# Patient Record
Sex: FEMALE
[De-identification: names, ages, dates, MRNs, and addresses within clinical notes are randomized per-mention and may not be internally consistent; named-entity substitution may affect disease eponyms.]

---

## 2018-11-05 ENCOUNTER — HOSPITAL ENCOUNTER (EMERGENCY)
Dept: HOSPITAL 31 - C.ER | Age: 53
Discharge: HOME | End: 2018-11-05
Payer: MEDICAID

## 2018-11-05 VITALS
RESPIRATION RATE: 15 BRPM | TEMPERATURE: 97.8 F | HEART RATE: 64 BPM | SYSTOLIC BLOOD PRESSURE: 126 MMHG | DIASTOLIC BLOOD PRESSURE: 78 MMHG

## 2018-11-05 VITALS — OXYGEN SATURATION: 100 %

## 2018-11-05 DIAGNOSIS — R42: Primary | ICD-10-CM

## 2018-11-05 DIAGNOSIS — I10: ICD-10-CM

## 2018-11-05 LAB
ALBUMIN SERPL-MCNC: 4.3 G/DL (ref 3.5–5)
ALBUMIN/GLOB SERPL: 1.1 {RATIO} (ref 1–2.1)
ALT SERPL-CCNC: 18 U/L (ref 9–52)
APTT BLD: 33 SECONDS (ref 21–34)
AST SERPL-CCNC: 36 U/L (ref 14–36)
BASOPHILS # BLD AUTO: 0.1 K/UL (ref 0–0.2)
BASOPHILS NFR BLD: 1 % (ref 0–2)
BILIRUB UR-MCNC: NEGATIVE MG/DL
BUN SERPL-MCNC: 11 MG/DL (ref 7–17)
CALCIUM SERPL-MCNC: 9.3 MG/DL (ref 8.6–10.4)
CK MB SERPL-MCNC: 0.92 NG/ML (ref 0–3.38)
EOSINOPHIL # BLD AUTO: 0.2 K/UL (ref 0–0.7)
EOSINOPHIL NFR BLD: 1.7 % (ref 0–4)
ERYTHROCYTE [DISTWIDTH] IN BLOOD BY AUTOMATED COUNT: 15.8 % (ref 11.5–14.5)
GFR NON-AFRICAN AMERICAN: > 60
GLUCOSE UR STRIP-MCNC: NORMAL MG/DL
HGB BLD-MCNC: 12.9 G/DL (ref 11–16)
INR PPP: 1.2
LEUKOCYTE ESTERASE UR-ACNC: (no result) LEU/UL
LIPASE: 133 U/L (ref 23–300)
LYMPHOCYTES # BLD AUTO: 2.5 K/UL (ref 1–4.3)
LYMPHOCYTES NFR BLD AUTO: 25.6 % (ref 20–40)
MCH RBC QN AUTO: 24.9 PG (ref 27–31)
MCHC RBC AUTO-ENTMCNC: 32.9 G/DL (ref 33–37)
MCV RBC AUTO: 75.8 FL (ref 81–99)
MONOCYTES # BLD: 0.4 K/UL (ref 0–0.8)
MONOCYTES NFR BLD: 4.4 % (ref 0–10)
NEUTROPHILS # BLD: 6.5 K/UL (ref 1.8–7)
NEUTROPHILS NFR BLD AUTO: 67.3 % (ref 50–75)
NRBC BLD AUTO-RTO: 0.1 % (ref 0–2)
PH UR STRIP: 8 [PH] (ref 5–8)
PLATELET # BLD: 365 K/UL (ref 130–400)
PMV BLD AUTO: 8.9 FL (ref 7.2–11.7)
PROT UR STRIP-MCNC: NEGATIVE MG/DL
PROTHROMBIN TIME: 13.2 SECONDS (ref 9.7–12.2)
RBC # BLD AUTO: 5.16 MIL/UL (ref 3.8–5.2)
RBC # UR STRIP: NEGATIVE /UL
SP GR UR STRIP: 1 (ref 1–1.03)
SQUAMOUS EPITHIAL: 1 /HPF (ref 0–5)
UROBILINOGEN UR-MCNC: NORMAL MG/DL (ref 0.2–1)
WBC # BLD AUTO: 9.7 K/UL (ref 4.8–10.8)

## 2018-11-05 PROCEDURE — 70450 CT HEAD/BRAIN W/O DYE: CPT

## 2018-11-05 PROCEDURE — 93005 ELECTROCARDIOGRAM TRACING: CPT

## 2018-11-05 PROCEDURE — 71045 X-RAY EXAM CHEST 1 VIEW: CPT

## 2018-11-05 PROCEDURE — 96361 HYDRATE IV INFUSION ADD-ON: CPT

## 2018-11-05 PROCEDURE — 81001 URINALYSIS AUTO W/SCOPE: CPT

## 2018-11-05 PROCEDURE — 84484 ASSAY OF TROPONIN QUANT: CPT

## 2018-11-05 PROCEDURE — 99284 EMERGENCY DEPT VISIT MOD MDM: CPT

## 2018-11-05 PROCEDURE — 85025 COMPLETE CBC W/AUTO DIFF WBC: CPT

## 2018-11-05 PROCEDURE — 85610 PROTHROMBIN TIME: CPT

## 2018-11-05 PROCEDURE — 96374 THER/PROPH/DIAG INJ IV PUSH: CPT

## 2018-11-05 PROCEDURE — 83690 ASSAY OF LIPASE: CPT

## 2018-11-05 PROCEDURE — 85730 THROMBOPLASTIN TIME PARTIAL: CPT

## 2018-11-05 PROCEDURE — 82550 ASSAY OF CK (CPK): CPT

## 2018-11-05 PROCEDURE — 82553 CREATINE MB FRACTION: CPT

## 2018-11-05 PROCEDURE — 80053 COMPREHEN METABOLIC PANEL: CPT

## 2018-11-05 NOTE — CT
Date of service: 11/05/2018



PROCEDURE:  CT HEAD WITHOUT CONTRAST.



HISTORY:

dizzy, vomiting



COMPARISON:

None available.



TECHNIQUE:

Axial computed tomography images were obtained through the head/brain 

without intravenous contrast.  



Radiation dose:



Total exam DLP = 859.11 mGy-cm.



This CT exam was performed using one or more of the following dose 

reduction techniques: Automated exposure control, adjustment of the 

mA and/or kV according to patient size, and/or use of iterative 

reconstruction technique.



FINDINGS:



HEMORRHAGE:

No intracranial hemorrhage. 



BRAIN:

No mass effect or edema.  The gray-white matter differentiation 

appears intact. Please note that MRI with diffusion imaging is more 

sensitive in the detection of acute ischemic event.



VENTRICLES:

No hydrocephalus. 



CALVARIUM:

Unremarkable.



PARANASAL SINUSES:

Unremarkable as visualized. No significant inflammatory changes.



MASTOID AIR CELLS:

Unremarkable as visualized. No inflammatory changes.



OTHER FINDINGS:

None.



IMPRESSION:

No acute intracranial pathology identified.

## 2018-11-05 NOTE — C.PDOC
History Of Present Illness


54 y/o female presents to the ER complaining of  vertigo type dizziness which 

has been present since yesterday. Patient states that she has associated nausea 

and vomiting. Patient reports that she went to see Dr. IRAM Rosario in the morning 

and he prescribed Meclizine and Reglan. She notes that she tried to take the 

medications but she vomited. She called  and he advised her to visit the

ER. Denies having fever, chills, CP, and SOB.


Time Seen by Provider: 18 15:44


Chief Complaint (Nursing): Abdominal Pain


History Per: Patient


History/Exam Limitations: no limitations


Onset/Duration Of Symptoms: Days


Current Symptoms Are (Timing): Still Present


Severity: Moderate





Past Medical History


Reviewed: Historical Data, Nursing Documentation, Vital Signs


Vital Signs: 





                                Last Vital Signs











Temp  98.4 F   18 15:26


 


Pulse  60   18 15:26


 


Resp  16   18 15:26


 


BP  111/68   18 15:26


 


Pulse Ox  100   18 15:26














- Medical History


PMH: HTN


Surgical History: No Surg Hx


Family History: States: No Known Family Hx





- Social History


Hx Alcohol Use: No


Hx Substance Use: No





- Immunization History


Hx Tetanus Toxoid Vaccination: No


Hx Influenza Vaccination: No


Hx Pneumococcal Vaccination: No





Review Of Systems


Except As Marked, All Systems Reviewed And Found Negative.


Constitutional: Negative for: Fever, Chills


Cardiovascular: Negative for: Chest Pain


Respiratory: Negative for: Shortness of Breath


Gastrointestinal: Positive for: Nausea, Vomiting


Neurological: Positive for: Dizziness





Physical Exam





- Physical Exam


Appears: Non-toxic, Other (uncomfortable)


Skin: Normal Color, Warm, Dry


Head: Atraumatic, Normacephalic


Eye(s): bilateral: Normal Inspection, PERRL, EOMI


Nose: Normal


Oral Mucosa: Moist


Neck: Supple


Chest: Symmetrical


Cardiovascular: Rhythm Regular


Respiratory: Normal Breath Sounds, No Rales, No Rhonchi, No Wheezing


Gastrointestinal/Abdominal: Normal Exam, Soft, No Tenderness, No Guarding, No 

Rebound


Neurological/Psych: Oriented x3, Normal Speech





ED Course And Treatment





- Laboratory Results


Result Diagrams: 


                                 18 16:38





                                 18 16:38


ECG: Interpreted By Me, Viewed By Me


ECG Rhythm: Sinus Rhythm


Rate From EC


O2 Sat by Pulse Oximetry: 100 (RA)


Pulse Ox Interpretation: Normal





- Radiology


CXR: Interpreted by Me, Viewed By Me


CXR Interpretation: Yes: No Acute Disease





- CT Scan/US


  ** CT-Head


Other Rad Studies (CT/US): Read By Radiologist, Radiology Report Reviewed


CT/US Interpretation: Date of service: 2018.  PROCEDURE:  CT HEAD WITHOUT 

CONTRAST.  HISTORY:  dizzy, vomiting.  COMPARISON:  None available.  TECHNIQUE: 

Axial computed tomography images were obtained through the head/brain without 

intravenous contrast.  Radiation dose:  Total exam DLP = 859.11 mGy-cm.  This CT

exam was performed using one or more of the following dose reduction techniques:

Automated exposure control, adjustment of the mA and/or kV according to patient 

size, and/or use of iterative reconstruction technique.  FINDINGS:  HEMORRHAGE: 

No intracranial hemorrhage.  BRAIN:  No mass effect or edema.  The gray-white 

matter differentiation appears intact. Please note that MRI with diffusion 

imaging is more sensitive in the detection of acute ischemic event.  VENTRICLES:

 No hydrocephalus.  CALVARIUM:  Unremarkable.  PARANASAL SINUSES:  Unremarkable 

as visualized. No significant inflammatory changes.  MASTOID AIR CELLS:  

Unremarkable as visualized. No inflammatory changes.  OTHER FINDINGS:  None.  

IMPRESSION:  No acute intracranial pathology identified.


Progress Note: Labs, UA, and CT- Head ordered and reviewed. Pt treated with 

Zofran IV and IV Fluids. Pt tolerated PO challenge. On re-evaluation, she feels 

better. She has been discharged and instruced to follow up with her PMD in 1 -2 

days.





Disposition





- Disposition


Disposition: HOME/ ROUTINE


Disposition Time: 18:07


Condition: STABLE


Additional Instructions: 


Follow up with  within 1-2 days. Return to ED if feel worse.


Instructions:  Vertigo (a Type of Dizziness)


Forms:  [email or web address] (English)





- Clinical Impression


Clinical Impression: 


 Vertigo








- PA / NP / Resident Statement


MD/DO has reviewed & agrees with the documentation as recorded.





- Scribe Statement


The provider has reviewed the documentation as recorded by the Gurinder Mckeon





Provider Attestation





All medical record entries made by the Aliviaibe were at my direction and 

personally dictated by me. I have reviewed the chart and agree that the record 

accurately reflects my personal performance of the history, physical exam, 

medical decision making, and the department course for this patient. I have also

personally directed, reviewed, and agree with the discharge instructions and 

disposition.

## 2018-11-05 NOTE — RAD
Date of service: 



11/05/2018



HISTORY:

 dizzy, vomiting 



COMPARISON:

None available.



FINDINGS:



LUNGS:

No active pulmonary disease.



PLEURA:

No significant pleural effusion identified, no pneumothorax apparent.



CARDIOVASCULAR:

No aortic atherosclerotic calcification present.



 Normal cardiac size. No pulmonary vascular congestion. 



OSSEOUS STRUCTURES:

No significant abnormalities.



VISUALIZED UPPER ABDOMEN:

Normal.



OTHER FINDINGS:

None.



IMPRESSION:

No active disease.

## 2018-11-06 NOTE — CARD
--------------- APPROVED REPORT --------------





Date of service: 11/05/2018



EKG Measurement

Heart [id number]

ND [id number]

[id number]

[id number]

[id number]



<Conclusion>

Normal sinus rhythm

Minimal voltage criteria for LVH, may be normal variant

Borderline ECG